# Patient Record
Sex: MALE | Race: WHITE | Employment: FULL TIME | URBAN - METROPOLITAN AREA
[De-identification: names, ages, dates, MRNs, and addresses within clinical notes are randomized per-mention and may not be internally consistent; named-entity substitution may affect disease eponyms.]

---

## 2017-06-02 ENCOUNTER — OFFICE VISIT (OUTPATIENT)
Dept: UROLOGY | Age: 62
End: 2017-06-02

## 2017-06-02 ENCOUNTER — HOSPITAL ENCOUNTER (OUTPATIENT)
Dept: ULTRASOUND IMAGING | Age: 62
Discharge: HOME | End: 2017-06-02
Attending: UROLOGY
Payer: COMMERCIAL

## 2017-06-02 VITALS
TEMPERATURE: 98.1 F | HEIGHT: 70 IN | BODY MASS INDEX: 27.63 KG/M2 | OXYGEN SATURATION: 98 % | HEART RATE: 83 BPM | WEIGHT: 193 LBS | SYSTOLIC BLOOD PRESSURE: 119 MMHG | DIASTOLIC BLOOD PRESSURE: 80 MMHG

## 2017-06-02 DIAGNOSIS — N50.89 TESTICULAR MASS: ICD-10-CM

## 2017-06-02 DIAGNOSIS — N50.89 TESTICULAR MASS: Primary | ICD-10-CM

## 2017-06-02 LAB
BILIRUB UR QL STRIP: NEGATIVE
GLUCOSE UR-MCNC: NEGATIVE MG/DL
KETONES P FAST UR STRIP-MCNC: NEGATIVE MG/DL
PH UR STRIP: 5.5 [PH] (ref 4.6–8)
PROT UR QL STRIP: NEGATIVE MG/DL
SP GR UR STRIP: 1 (ref 1–1.03)
UA UROBILINOGEN AMB POC: NORMAL (ref 0.2–1)
URINALYSIS CLARITY POC: CLEAR
URINALYSIS COLOR POC: YELLOW
URINE BLOOD POC: NEGATIVE
URINE LEUKOCYTES POC: NEGATIVE
URINE NITRITES POC: NEGATIVE

## 2017-06-02 PROCEDURE — 76870 US EXAM SCROTUM: CPT

## 2017-06-02 RX ORDER — ERGOCALCIFEROL 1.25 MG/1
CAPSULE ORAL
Refills: 0 | COMMUNITY
Start: 2017-05-25

## 2017-06-02 RX ORDER — ASPIRIN 81 MG/1
TABLET ORAL DAILY
COMMUNITY

## 2017-06-02 NOTE — MR AVS SNAPSHOT
Visit Information Date & Time Provider Department Dept. Phone Encounter #  
 6/2/2017 10:00 AM Sarahi Wright, [facility] [phone] [id number] Upcoming Health Maintenance Date Due Hepatitis C Screening 1955 Pneumococcal 19-64 Medium Risk (1 of 1 - PPSV23) 10/18/1975 DTaP/Tdap/Td series (1 - Tdap) 10/18/1977 FOBT Q 1 YEAR AGE 50-75 10/18/2006 ZOSTER VACCINE AGE 60> 10/18/2016 INFLUENZA AGE 9 TO ADULT 8/1/2017 Allergies as of 6/2/2017  Review Complete On: 6/2/2017 By: Yuan Paul LPN No Known Allergies Current Immunizations  Never Reviewed No immunizations on file. Not reviewed this visit You Were Diagnosed With   
  
 Codes Comments Testicular mass    -  Primary ICD-10-CM: N50.9 ICD-9-CM: 608.89 Vitals BP Pulse Temp Height(growth percentile) Weight(growth percentile) SpO2  
 119/80 (BP 1 Location: Left arm, BP Patient Position: Sitting) 83 98.1 °F (36.7 °C) 5' 9.5\" (1.765 m) 193 lb (87.5 kg) 98% BMI Smoking Status 28.09 kg/m2 Current Every Day Smoker Vitals History BMI and BSA Data Body Mass Index Body Surface Area 28.09 kg/m 2 2.07 m 2 Preferred Pharmacy Pharmacy Name Phone [address], [address] [place] [address] [facility] [phone] Your Updated Medication List  
  
   
This list is accurate as of: 6/2/17 11:09 AM.  Always use your most recent med list.  
  
  
  
  
 aspirin delayed-release 81 mg tablet Take  by mouth daily. clotrimazole-betamethasone topical cream  
Commonly known as:  LOTRISONE  
  
 diclofenac EC 75 mg EC tablet Commonly known as:  VOLTAREN  
1 tab PO BID with food  
  
 gabapentin 300 mg capsule Commonly known as:  NEURONTIN Take 1 Cap by mouth three (3) times daily. ketoconazole 200 mg tablet Commonly known as:  NIZORAL  
  
 topiramate 25 mg tablet Commonly known as:  TOPAMAX 3 tabs PO QHS  
  
 triamcinolone acetonide 0.1 % topical cream  
Commonly known as:  KENALOG  
  
 VITAMIN D2 50,000 unit capsule Generic drug:  ergocalciferol We Performed the Following AMB POC URINALYSIS DIP STICK AUTO W/O MICRO [91864 CPT(R)] To-Do List   
 06/02/2017 Imaging:  US SCROTUM/TESTICLES   
  
 06/02/2017 1:45 PM  
  Appointment with [facility]  RM 2 at [address] [place] [address] ([phone]) OUTSIDE FILMS  - Any outside films related to the study being scheduled should be brought with you on the day of the exam.  If this cannot be done there may be a delay in the reading of the study. MEDICATIONS  - Patient must bring a complete list of all medications currently taking to include prescriptions, over-the-counter meds, herbals, vitamins & any dietary supplements Patient Instructions Testicular Pain: Care Instructions Your Care Instructions Pain in the testicles can be caused by many things. These include an injury to your testicles, an infection, and testicular torsion. Injuries and genital problems most often happen during sports or recreational activities, at work, or in a fall. Pain caused by an injury usually goes away quickly. There is usually no long-term harm to your testicles. Infections that may cause pain include: · An infection of the testicles. This is called orchitis. · An abscess in the scrotum or testicles. · Some sexually transmitted infections (STIs). · A swelling of the tube attached to a testicle. This swelling is called epididymitis. It can cause pain and is sometimes caused by an infection. Testicular torsion happens when a testicle twists on the spermatic cord. This cuts off the blood supply to the testicle. This is a serious condition that requires surgery. Follow-up care is a key part of your treatment and safety.  Be sure to make and go to all appointments, and call your doctor if you are having problems. It's also a good idea to know your test results and keep a list of the medicines you take. How can you care for yourself at home? · Rest and protect your testicles and groin. Stop, change, or take a break from any activity that may be causing your pain or soreness. · Put ice or a cold pack on the area for 10 to 20 minutes at a time. Put a thin cloth between the ice and your skin. · Wear briefs, not boxers. Briefs help support the injured area. You can use a jock strap if it helps relieve your pain. · If your doctor prescribed antibiotics, take them as directed. Do not stop taking them just because you feel better. You need to take the full course of antibiotics. · Ask your doctor if you can take an over-the-counter pain medicine, such as acetaminophen (Tylenol), ibuprofen (Advil, Motrin), or naproxen (Aleve). Be safe with medicines. Read and follow all instructions on the label. · If the doctor gave you a prescription medicine for pain, take it as prescribed. When should you call for help? Call your doctor now or seek immediate medical care if: 
· You have severe or increasing pain. · You notice a change in how your testicles look or are positioned in your scrotum. · You notice new or worse swelling in your scrotum. · You have symptoms of a urinary problem, such as a urinary tract infection. These may include: 
¨ Pain or burning when you urinate. ¨ A frequent need to urinate without being able to pass much urine. ¨ Pain in the flank, which is just below the rib cage and above the waist on either side of the back. ¨ Blood in your urine. ¨ A fever. Watch closely for changes in your health, and be sure to contact your doctor if: 
· You do not get better as expected. Where can you learn more? Go to http://nguyen-matt.info/.  
Enter Z711 in the search box to learn more about \"Testicular Pain: Care Instructions. \" Current as of: August 12, 2016 Content Version: 11.2 © 9908-7999 [employer]andelion. Care instructions adapted under license by [employer] (which disclaims liability or warranty for this information). If you have questions about a medical condition or this instruction, always ask your healthcare professional. [address] any warranty or liability for your use of this information. Introducing [facility]! Kaitlyn Bennett introduces Yicha Online patient portal. Now you can access parts of your medical record, email your doctor's office, and request medication refills online. 1. In your internet browser, go to [email or web address] 2. Click on the First Time User? Click Here link in the Sign In box. You will see the New Member Sign Up page. 3. Enter your Y[employer]a Online Access Code exactly as it appears below. You will not need to use this code after youve completed the sign-up process. If you do not sign up before the expiration date, you must request a new code. · [employer] Online Access Code: [email or web address] Expires: 8/31/2017  9:55 AM 
 
4. Enter the last four digits of your Social Security Number (xxxx) and Date of Birth (mm/dd/yyyy) as indicated and click Submit. You will be taken to the next sign-up page. 5. Create a Yicha Online ID. This will be your Yicha Online login ID and cannot be changed, so think of one that is secure and easy to remember. 6. Create a [employer] Online password. You can change your password at any time. 7. Enter your Password Reset Question and Answer. This can be used at a later time if you forget your password. 8. Enter your e-mail address. You will receive e-mail notification when new information is available in [address] 9. Click Sign Up. You can now view and download portions of your medical record. 10. Click the Download Summary menu link to download a portable copy of your medical information. If you have questions, please visit the Frequently Asked Questions section of the [employer] website. Remember, [employer] is NOT to be used for urgent needs. For medical emergencies, dial 911. Now available from your iPhone and Android! Please provide this summary of care documentation to your next provider. Your primary care clinician is listed as ROBYN WOOD. If you have any questions after today's visit, please call [phone].

## 2017-06-02 NOTE — PROGRESS NOTES
Mr. Blanca Anthony has a reminder for a \"due or due soon\" health maintenance. I have asked that he contact his primary care provider for follow-up on this health maintenance. RBV per Dr. Barbara Caldwell an order was placed for patient to have a Scrotal US.
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
## 2017-06-02 NOTE — PATIENT INSTRUCTIONS
Testicular Pain: Care Instructions  Your Care Instructions    Pain in the testicles can be caused by many things. These include an injury to your testicles, an infection, and testicular torsion. Injuries and genital problems most often happen during sports or recreational activities, at work, or in a fall. Pain caused by an injury usually goes away quickly. There is usually no long-term harm to your testicles. Infections that may cause pain include:  · An infection of the testicles. This is called orchitis. · An abscess in the scrotum or testicles. · Some sexually transmitted infections (STIs). · A swelling of the tube attached to a testicle. This swelling is called epididymitis. It can cause pain and is sometimes caused by an infection. Testicular torsion happens when a testicle twists on the spermatic cord. This cuts off the blood supply to the testicle. This is a serious condition that requires surgery. Follow-up care is a key part of your treatment and safety. Be sure to make and go to all appointments, and call your doctor if you are having problems. It's also a good idea to know your test results and keep a list of the medicines you take. How can you care for yourself at home? · Rest and protect your testicles and groin. Stop, change, or take a break from any activity that may be causing your pain or soreness. · Put ice or a cold pack on the area for 10 to 20 minutes at a time. Put a thin cloth between the ice and your skin. · Wear briefs, not boxers. Briefs help support the injured area. You can use a jock strap if it helps relieve your pain. · If your doctor prescribed antibiotics, take them as directed. Do not stop taking them just because you feel better. You need to take the full course of antibiotics. · Ask your doctor if you can take an over-the-counter pain medicine, such as acetaminophen (Tylenol), ibuprofen (Advil, Motrin), or naproxen (Aleve). Be safe with medicines.  Read and follow all instructions on the label. · If the doctor gave you a prescription medicine for pain, take it as prescribed. When should you call for help? Call your doctor now or seek immediate medical care if:  · You have severe or increasing pain. · You notice a change in how your testicles look or are positioned in your scrotum. · You notice new or worse swelling in your scrotum. · You have symptoms of a urinary problem, such as a urinary tract infection. These may include:  ¨ Pain or burning when you urinate. ¨ A frequent need to urinate without being able to pass much urine. ¨ Pain in the flank, which is just below the rib cage and above the waist on either side of the back. ¨ Blood in your urine. ¨ A fever. Watch closely for changes in your health, and be sure to contact your doctor if:  · You do not get better as expected. Where can you learn more? Go to http://nguyen-matt.info/. Enter F840 in the search box to learn more about \"Testicular Pain: Care Instructions. \"  Current as of: August 12, 2016  Content Version: 11.2  © 3371-9108 Open Mile. Care instructions adapted under license by [employer] (which disclaims liability or warranty for this information). If you have questions about a medical condition or this instruction, always ask your healthcare professional. [address] any warranty or liability for your use of this information.

## 2019-07-23 ENCOUNTER — OFFICE VISIT (OUTPATIENT)
Dept: ORTHOPEDIC SURGERY | Age: 64
End: 2019-07-23

## 2019-07-23 VITALS
TEMPERATURE: 97.7 F | BODY MASS INDEX: 28.09 KG/M2 | HEIGHT: 70 IN | HEART RATE: 80 BPM | SYSTOLIC BLOOD PRESSURE: 143 MMHG | DIASTOLIC BLOOD PRESSURE: 90 MMHG | RESPIRATION RATE: 18 BRPM

## 2019-07-23 DIAGNOSIS — M54.12 CERVICAL RADICULOPATHY: Primary | ICD-10-CM

## 2019-07-23 RX ORDER — AMITRIPTYLINE HYDROCHLORIDE 25 MG/1
75 TABLET, FILM COATED ORAL
Qty: 90 TAB | Refills: 2 | Status: SHIPPED | OUTPATIENT
Start: 2019-07-23

## 2019-07-23 RX ORDER — KETOROLAC TROMETHAMINE 15 MG/ML
60 INJECTION, SOLUTION INTRAMUSCULAR; INTRAVENOUS ONCE
Qty: 1 VIAL | Refills: 0
Start: 2019-07-23 | End: 2019-07-23

## 2019-07-23 RX ORDER — PREDNISONE 10 MG/1
TABLET ORAL
Qty: 21 TAB | Refills: 0 | Status: SHIPPED | OUTPATIENT
Start: 2019-07-23

## 2019-07-23 RX ORDER — CYCLOBENZAPRINE HCL 10 MG
TABLET ORAL
COMMUNITY

## 2019-07-23 NOTE — PROGRESS NOTES
[address], [address]  [place] [address]  Phone: [phone]  Fax: [phone]        Abdulkadir Aquino  : 1955  PCP: Krupa Peng MD  2019    NEW PATIENT      HISTORY OF PRESENT ILLNESS  Fox Cabrera is a 59 y.o. male c/o an exacerbation of chronic neck pain. Pt notes that about 3-4 months ago, an elderly neighbor fell in his front yard, and when he tried to help him get up, he fell as well. He finished mowing his grass, but he began to experience neck pain that resolved about a week or so later. At the end of , he was working in his yard, and he had excruciating neck pain that began radiating into his LUE into his ring and little finger. He also c/o a burning pain radiating into his LUE and into his left chest, but it has improved some, especially his chest. He feels weakness in his left hand and notes that he cannot move all of the fingers of his left hand. Pt states he can move his neck in a certain way to improve his LUE burning, numbness, and tingling. He has no h/o spine surgery. He has tried Flexeril and Prednisone but is unsure if they were beneficial. He has difficulty sleeping due to pain. He previously saw Dr. Corinna Pelayo for low back pain and tried: Gabapentin, Topamax, and Diclofenac. He states that he tried PT previously without benefit for his low back pain. He rates his pain as an 8/10 today. ASSESSMENT  His symptoms are likely associated with a left C8/T1 radiculopathy. On examination, he had weakness (intrinsic hand, triceps) and decreased sensation in a C8 distribution. He has pain in the chest, potentially at the T1 dermatome. PLAN  1. Cervical MRI to evaluate for left C8/T1 radiculopathy. 2. Toradol injection - 60 mg  3.  10 mg Prednisone injection. 4. Amitriptyline 75 mg QHS. 5. Work note to remain out of work while undergoing diagnostics and treatment.     Pt will f/u after MRI or sooner if needed. Diagnoses and all orders for this visit:    1. Cervical radiculopathy  -     MRI CERV SPINE WO CONT; Future  -     predniSONE (STERAPRED DS) 10 mg dose pack; See administration instruction per 10mg dose pack  -     KETOROLAC TROMETHAMINE INJ  -     ketorolac (TORADOL) 15 mg/mL soln injection; 4 mL by IntraMUSCular route once for 1 dose. -     AK THER/PROPH/DIAG INJECTION, SUBCUT/IM  -     amitriptyline (ELAVIL) 25 mg tablet; Take 3 Tabs by mouth nightly. CHIEF COMPLAINT  Rodrigo Palacios is seen today in consultation at the request of Antonio Navas MD for complaints of neck pain radiating into LUE. PAST MEDICAL HISTORY   Past Medical History:   Diagnosis Date    Arthritis     degenerative arthritis of spine       No past surgical history on file. MEDICATIONS    Current Outpatient Medications   Medication Sig Dispense Refill    VITAMIN D2 50,000 unit capsule   0    aspirin delayed-release 81 mg tablet Take  by mouth daily.  triamcinolone acetonide (KENALOG) 0.1 % topical cream   0    gabapentin (NEURONTIN) 300 mg capsule Take 1 Cap by mouth three (3) times daily. 90 Cap 1    clotrimazole-betamethasone (LOTRISONE) topical cream   0    ketoconazole (NIZORAL) 200 mg tablet   0    topiramate (TOPAMAX) 25 mg tablet 3 tabs PO QHS 90 Tab 0    diclofenac EC (VOLTAREN) 75 mg EC tablet 1 tab PO BID with food 30 Tab 0       ALLERGIES  No Known Allergies       SOCIAL HISTORY    Social History     Socioeconomic History    Marital status:      Spouse name: Not on file    Number of children: Not on file    Years of education: Not on file    Highest education level: Not on file   Tobacco Use    Smoking status: Current Every Day Smoker     Packs/day: 1.50    Smokeless tobacco: Never Used   Substance and Sexual Activity    Alcohol use:  Yes     Alcohol/week: 0.0 standard drinks     Comment: social    Drug use: No    Sexual activity: Yes       FAMILY HISTORY  Family History Problem Relation Age of Onset    Diabetes Other     Cancer Mother          REVIEW OF SYSTEMS  Review of Systems   Musculoskeletal: Positive for neck pain ( LUE paraesthesia and weakness). PHYSICAL EXAMINATION  There were no vitals taken for this visit. Pain Assessment  1/8/2016   Location of Pain Back;Leg;Hip   Pain Location Comment bilat hips   Location Modifiers Left;Right   Severity of Pain 1   Quality of Pain Sharp;Burning   Quality of Pain Comment occasionally numbness, tingling, weakness   Duration of Pain A few minutes   Frequency of Pain Intermittent   Aggravating Factors Standing;Walking   Aggravating Factors Comment carrying loads, arching backwards   Limiting Behavior Yes   Relieving Factors Rest;Heat   Relieving Factors Comment heat is soothing while applied, traction         Constitutional:  Well developed, well nourished, in no acute distress. Psychiatric: Affect and mood are appropriate. HEENT: Normocephalic, atraumatic. Extraocular movements intact. Integumentary: No rashes or abrasions noted on exposed areas. Cardiovascular: Regular rate and rhythm. Pulmonary: Clear to auscultation bilaterally. SPINE/MUSCULOSKELETAL EXAM    Cervical spine:  Neck is midline. Normal muscle tone. No focal atrophy is noted. ROM pain free. Shoulder ROM intact. No tenderness to palpation. Positive Spurling's sign on the L. Negative Tinel's sign. Negative Muñiz's sign. Decreased sensation in a C8 distribution on the L. Lumbar spine:  No rash, ecchymosis, or gross obliquity. No fasciculations. No focal atrophy is noted. No pain with hip ROM. Full range of motion. No tenderness to palpation. No tenderness to palpation at the sciatic notch. SI joints non-tender. Trochanters non tender. Sensation in the bilateral legs grossly intact to light touch.       MOTOR:      Biceps  Triceps Deltoids Wrist Ext Wrist Flex Hand Intrin   Right 5/5 5/5 5/5 5/5 5/5 5/5   Left 5/5 4/5 5/5 5/5 5/5 4/5             Hip Flex  Quads Hamstrings Ankle DF EHL Ankle PF   Right 5/5 5/5 5/5 5/5 5/5 5/5   Left 5/5 5/5 5/5 5/5 5/5 5/5     Weakness with finger abduction on the L. Intrinsic hand weakness on the L. Weakness with triceps on the L. Overall weakness in C8 distribution. DTRs are 2+ biceps, triceps, brachioradialis, patella, and Achilles. Negative Straight Leg raise. Squat not tested. No difficulty with tandem gait. Ambulation without assistive device. FWB. RADIOGRAPHS  2V Cervical XR images taken on 7/23/19 personally reviewed with patient:  Straightening of cervical lordosis  Flexed forward posture  Disc degeneration C4 down  Facet sclerosis     reviewed    Mr. Cristian Alicea has a reminder for a \"due or due soon\" health maintenance. I have asked that he contact his primary care provider for follow-up on this health maintenance. 32 minutes of face-to-face contact were spent with the patient during today's visit extensively discussing symptoms and treatment plan. All questions were answered. More than half of this visit today was spent on counseling. Written by Renae Montes, as dictated by Dr. Verner Jacobsen. I, Dr. Verner Jacobsen, confirm that all documentation is accurate.

## 2019-07-23 NOTE — PATIENT INSTRUCTIONS
Amitriptyline Daily Instructions:  Days 1 to 3: Take one 25 mg pill at night  Days 4 to 6: Take two 25 mg pills at night  After day 6: Take three 25 mg pills at night     Pinched Nerve in the Neck: Care Instructions  Your Care Instructions  A pinched nerve in the neck happens when a vertebra or disc in the upper part of your spine is damaged. This damage can happen because of an injury. Or it can just happen with age. The changes caused by the damage may put pressure on a nearby nerve root, pinching it. This causes symptoms such as sharp pain in your neck, shoulder, arm, hand, or back. You may also have tingling or numbness. Sometimes it makes your arm weaker. The symptoms are usually worse when you turn your head or strain your neck. For many people, the symptoms get better over time and finally go away. Early treatment usually includes medicines for pain and swelling. Sometimes physical therapy and special exercises may help. Follow-up care is a key part of your treatment and safety. Be sure to make and go to all appointments, and call your doctor if you are having problems. It's also a good idea to know your test results and keep a list of the medicines you take. How can you care for yourself at home? · Be safe with medicines. Read and follow all instructions on the label. ? If the doctor gave you a prescription medicine for pain, take it as prescribed. ? If you are not taking a prescription pain medicine, ask your doctor if you can take an over-the-counter medicine. · Try using a heating pad on a low or medium setting for 15 to 20 minutes every 2 or 3 hours. Try a warm shower in place of one session with the heating pad. You can also buy single-use heat wraps that last up to 8 hours. · You can also try an ice pack for 10 to 15 minutes every 2 to 3 hours. There isn't strong evidence that either heat or ice will help. But you can try them to see if they help you. · Don't spend too long in one position. Take short breaks to move around and change positions. · Wear a seat belt and shoulder harness when you are in a car. · Sleep with a pillow under your head and neck that keeps your neck straight. · If you were given a neck brace (cervical collar) to limit neck motion, wear it as instructed for as many days as your doctor tells you to. Do not wear it longer than you were told to. Wearing a brace for too long can lead to neck stiffness and can weaken the neck muscles. · Follow your doctor's instructions for gentle neck-stretching exercises. · Do not smoke. Smoking can slow healing of your discs. If you need help quitting, talk to your doctor about stop-smoking programs and medicines. These can increase your chances of quitting for good. · Avoid strenuous work or exercise until your doctor says it is okay. When should you call for help? Call 911 anytime you think you may need emergency care. For example, call if:    · You are unable to move an arm or a leg at all.   [facility] your doctor now or seek immediate medical care if:    · You have new or worse symptoms in your arms, legs, chest, belly, or buttocks. Symptoms may include:  ? Numbness or tingling. ? Weakness. ? Pain.     · You lose bladder or bowel control.    Watch closely for changes in your health, and be sure to contact your doctor if:    · You are not getting better as expected. Where can you learn more? Go to http://nguyen-matt.info/. Enter A554 in the search box to learn more about \"Pinched Nerve in the Neck: Care Instructions. \"  Current as of: September 20, 2018  Content Version: 12.1  © 8073-4171 Healthwise, Incorporated. Care instructions adapted under license by [employer] (which disclaims liability or warranty for this information).  If you have questions about a medical condition or this instruction, always ask your healthcare professional. [address]  any warranty or liability for your use of this information.

## 2019-07-23 NOTE — LETTER
NOTIFICATION RETURN TO WORK  
 
7/23/2019 1:58 PM 
 
Mr. Delphine Geller 
[address] [facility] [place] [address] To Whom It May Concern: 
 
Delphine Geller is currently under the care of [facility]. He will remain out of work while he is undergoing diagnostic testing and further treatment. If there are questions or concerns please have the patient contact our office.  
 
 
 
Sincerely, 
 
 
Cherie Zapata MD

## 2019-07-26 ENCOUNTER — TELEPHONE (OUTPATIENT)
Dept: ORTHOPEDIC SURGERY | Age: 64
End: 2019-07-26

## 2019-07-26 DIAGNOSIS — R52 PAIN: Primary | ICD-10-CM

## 2019-07-26 NOTE — TELEPHONE ENCOUNTER
Patient called and stated that he has severe neck pain and his does not think he can not proceed with the MRI without something to assist him during the procedure. The patient is wanting something prescribed for the procedure to assist with the pain during the procedure.      Patient may be reached at [phone]

## 2019-07-26 NOTE — TELEPHONE ENCOUNTER
VO for Valium 5 mg PO 30 mins prior to MRI. Do not take with Alprazolam. Must have . Phone in.  Dispense #1 0RF

## 2019-07-29 RX ORDER — DIAZEPAM 2 MG/1
TABLET ORAL
Status: CANCELLED | OUTPATIENT
Start: 2019-07-29

## 2019-07-29 NOTE — TELEPHONE ENCOUNTER
He does not want Valium he is not claustrophobic. He wants something for pain it does not have be opioids. The prednisone did not help much and he did not receive the amitriptyline from the pharmacy. He will go by and pick it up.

## 2019-07-29 NOTE — TELEPHONE ENCOUNTER
Patient called again and is requesting a call back from [facility] office in regards to the MRI and medication. Patient tel. [phone].

## 2019-07-30 DIAGNOSIS — R52 PAIN: ICD-10-CM

## 2019-07-30 RX ORDER — HYDROCODONE BITARTRATE AND ACETAMINOPHEN 7.5; 325 MG/1; MG/1
1 TABLET ORAL ONCE
Qty: 1 TAB | Refills: 0 | Status: SHIPPED | OUTPATIENT
Start: 2019-07-30 | End: 2019-07-30 | Stop reason: SDUPTHER

## 2019-07-30 RX ORDER — HYDROCODONE BITARTRATE AND ACETAMINOPHEN 7.5; 325 MG/1; MG/1
1 TABLET ORAL ONCE
Qty: 1 TAB | Refills: 0 | Status: SHIPPED | OUTPATIENT
Start: 2019-07-30 | End: 2019-07-30

## 2019-07-30 NOTE — TELEPHONE ENCOUNTER
VO received for patient to have a one time rx for norco 7.5mg qty#1 RF0 patient informed that this may or may not help his nerve pain. Original rx printed at [facility] location. New encounter opened and order redone.

## 2019-07-30 NOTE — PROGRESS NOTES
Original rx printed at [facility] [place] [facility] location. New encounter opened and order redone.

## 2019-07-31 ENCOUNTER — HOSPITAL ENCOUNTER (OUTPATIENT)
Age: 64
Discharge: HOME | End: 2019-07-31
Attending: PHYSICAL MEDICINE & REHABILITATION
Payer: COMMERCIAL

## 2019-07-31 DIAGNOSIS — M54.12 CERVICAL RADICULOPATHY: ICD-10-CM

## 2019-07-31 PROCEDURE — 72141 MRI NECK SPINE W/O DYE: CPT

## 2019-08-01 ENCOUNTER — TELEPHONE (OUTPATIENT)
Dept: ORTHOPEDIC SURGERY | Age: 64
End: 2019-08-01

## 2019-08-01 DIAGNOSIS — J39.2 PHARYNGEAL MASS: Primary | ICD-10-CM

## 2019-08-01 NOTE — TELEPHONE ENCOUNTER
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    ----- Message -----  From: Ashok Rasmussen Results In  Sent: 7/31/2019  12:55 PM EDT  To: Aubrie Victoria MD

## 2019-08-01 NOTE — TELEPHONE ENCOUNTER
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 Return call information left.

## 2019-08-28 ENCOUNTER — OFFICE VISIT (OUTPATIENT)
Dept: ORTHOPEDIC SURGERY | Age: 64
End: 2019-08-28

## 2019-08-28 VITALS
DIASTOLIC BLOOD PRESSURE: 90 MMHG | RESPIRATION RATE: 14 BRPM | HEIGHT: 71 IN | OXYGEN SATURATION: 97 % | WEIGHT: 201.4 LBS | BODY MASS INDEX: 28.19 KG/M2 | HEART RATE: 89 BPM | SYSTOLIC BLOOD PRESSURE: 138 MMHG | TEMPERATURE: 98.3 F

## 2019-08-28 DIAGNOSIS — M54.12 CERVICAL RADICULOPATHY: Primary | ICD-10-CM

## 2019-08-28 DIAGNOSIS — M48.02 CERVICAL SPINAL STENOSIS: ICD-10-CM

## 2019-08-28 NOTE — PROGRESS NOTES
[address], [address]  [place] [address]  Phone: [phone]  Fax: [phone]        Formerly [facility]  : 1955  PCP: Cooper Quintana MD  2019    PROGRESS NOTE      HISTORY OF PRESENT ILLNESS  Eagle Aleman is a 59 y.o. male who was seen as a new patient 19 with c/o an exacerbation of chronic neck pain. Pt notes that about 3-4 months ago, an elderly neighbor fell in his front yard, and when he tried to help him get up, he fell as well. He finished mowing his grass, but he began to experience neck pain that resolved about a week or so later. At the end of , he was working in his yard, and he had excruciating neck pain that began radiating into his LUE into his ring and little finger. He also c/o a burning pain radiating into his LUE and into his left chest, but it has improved some, especially his chest. He feels weakness in his left hand and notes that he cannot move all of the fingers of his left hand. Pt states he can move his neck in a certain way to improve his LUE burning, numbness, and tingling. He has no h/o spine surgery. He has tried Flexeril and Prednisone but is unsure if they were beneficial. He has difficulty sleeping due to pain. He previously saw Dr. Mario Alberto Mahan for low back pain and tried: Gabapentin, Topamax, and Diclofenac. He states that he tried PT previously without benefit for his low back pain. He was given a Toradol injection (19). He was prescribed Amitriptyline. Eagle Aleman comes in to the office today for f/u. He did not find any relief from a  Prednisone dose pack. Pt notes that his neck pain has significantly improved, and he has not had any of the burning sensations in his LUE. He feels that most of his flare ups occur after he has been working overhead. He found relief with moving/stretching his neck to the right and down.  Pt reports h/o low back and associated leg pains that tend to be worse when he wears his belt tightly. He saw Dr. Jennifer Aquino for these complaints previously. Cervical MRI 7/31/19:  Advanced multilevel DDD and DJD. The canal is maximally narrowed, moderate central spinal stenosis C4/C5 and C5/C6. Multilevel foraminal narrowing, up to high grade. Possible minimal cord signal abnormality at C4/C5, where cord is most compressed. If indeed real, presumed gliosis or edema secondary to central spinal stenosis. Possible polyploid mucosal lesion at posterior oropharynx. Rounded 1.2 cm focus larger/more caudal than usual for uvula and suggested posterior to palate and uvula. Nonspecific. Recommend ENT referral. Reference axial series 6 image 116 and sagittal series 3 image 11. Of note, Pt notes that he was seen by an ENT and was cleared. He rates his pain as a 0/10 today. ASSESSMENT  His symptoms are likely related to a left cervical radiculopathy vs spinal stenosis as evidenced on his MRI. He continues to have decreased sensation and weakness on the left in a C8 distribution. We discussed the option of an EMG to further evaluate LUE weakness/numbness. He is not interested in a surgical option. PLAN  1. We discussed the option of an EMG to further evaluate LUE weakness/numbness, but he declines at this time. 2. He is not interested in a surgical consult/option. 3. He can call if he needs a Prednisone dose pack for a flare up. Pt will f/u in 6 months or sooner as needed. Diagnoses and all orders for this visit:    1. Cervical radiculopathy    2. Cervical spinal stenosis       PAST MEDICAL HISTORY   Past Medical History:   Diagnosis Date    Arthritis     degenerative arthritis of spine       No past surgical history on file. Rosie Ram       MEDICATIONS    Current Outpatient Medications   Medication Sig Dispense Refill    cyclobenzaprine (FLEXERIL) 10 mg tablet cyclobenzaprine 10 mg tablet      predniSONE (STERAPRED DS) 10 mg dose pack See administration instruction per 10mg dose pack 21 Tab 0    amitriptyline (ELAVIL) 25 mg tablet Take 3 Tabs by mouth nightly. 90 Tab 2    VITAMIN D2 50,000 unit capsule   0    aspirin delayed-release 81 mg tablet Take  by mouth daily.  triamcinolone acetonide (KENALOG) 0.1 % topical cream   0    gabapentin (NEURONTIN) 300 mg capsule Take 1 Cap by mouth three (3) times daily. 90 Cap 1    clotrimazole-betamethasone (LOTRISONE) topical cream   0    ketoconazole (NIZORAL) 200 mg tablet   0    topiramate (TOPAMAX) 25 mg tablet 3 tabs PO QHS 90 Tab 0    diclofenac EC (VOLTAREN) 75 mg EC tablet 1 tab PO BID with food 30 Tab 0        ALLERGIES  No Known Allergies       SOCIAL HISTORY    Social History     Socioeconomic History    Marital status:      Spouse name: Not on file    Number of children: Not on file    Years of education: Not on file    Highest education level: Not on file   Tobacco Use    Smoking status: Current Every Day Smoker     Packs/day: 1.50    Smokeless tobacco: Never Used   Substance and Sexual Activity    Alcohol use: Yes     Alcohol/week: 0.0 standard drinks     Comment: social    Drug use: No    Sexual activity: Yes   Other Topics Concern       FAMILY HISTORY  Family History   Problem Relation Age of Onset    Diabetes Other     Cancer Mother          REVIEW OF SYSTEMS  Review of Systems   Musculoskeletal: Positive for neck pain. LUE paraesthesia and weakness          PHYSICAL EXAMINATION  Visit Vitals  /90 (BP 1 Location: Left arm, BP Patient Position: Sitting)   Pulse 89   Temp 98.3 °F (36.8 °C) (Oral)   Resp 14   Ht 5' 10.75\" (1.797 m)   Wt 201 lb 6.4 oz (91.4 kg)   SpO2 97%   BMI 28.29 kg/m²       Pain Assessment  7/23/2019   Location of Pain Neck   Pain Location Comment -   Location Modifiers -   Severity of Pain 8   Quality of Pain Burning; Sharp   Quality of Pain Comment -   Duration of Pain Persistent   Frequency of Pain Constant   Aggravating Factors -   Aggravating Factors Comment - Limiting Behavior -   Relieving Factors -   Relieving Factors Comment -           Constitutional:  Well developed, well nourished, in no acute distress. Psychiatric: Affect and mood are appropriate. Integumentary: No rashes or abrasions noted on exposed areas. SPINE/MUSCULOSKELETAL EXAM    Cervical spine:  Neck is midline. Normal muscle tone. No focal atrophy is noted. ROM pain free. Shoulder ROM intact. No tenderness to palpation. Positive Spurling's sign on the L. Negative Tinel's sign. Negative Muñiz's sign. Decreased sensation in a C8 distribution on the L.        Lumbar spine:  No rash, ecchymosis, or gross obliquity. No fasciculations. No focal atrophy is noted. No pain with hip ROM. Full range of motion. No tenderness to palpation. No tenderness to palpation at the sciatic notch. SI joints non-tender. Trochanters non tender. Sensation in the bilateral legs grossly intact to light touch. Updates from 8/28/19:  Decreased sensation from C6-C8 on the left  Intrinsic hand weakness on the left. Wrist extension weakness on the L   No UMN signs    MOTOR:      Biceps  Triceps Deltoids Wrist Ext Wrist Flex Hand Intrin   Right 5/5 5/5 5/5 5/5 5/5 5/5   Left 5/5 4/5 5/5 4/5 5/5 4/5             Hip Flex  Quads Hamstrings Ankle DF EHL Ankle PF   Right 5/5 5/5 5/5 5/5 5/5 5/5   Left 5/5 5/5 5/5 5/5 5/5 5/5     Weakness with finger abduction on the L. Intrinsic hand weakness on the L. Weakness with triceps on the L. Overall weakness in C8 distribution.     DTRs are 2+ biceps, triceps, brachioradialis, patella, and Achilles.     Negative Straight Leg raise. Squat not tested. No difficulty with tandem gait.      Ambulation without assistive device.  FWB.       RADIOGRAPHS  Cervical MRI images taken on 7/31/19 personally reviewed with patient:  FINDINGS:  On the highest axial slice of this scan, there is a prominent rounded 1.2 x 1.0  cm focus projected centrally with in the oropharynx, peripheral increased T2  signal and slightly lesser T2 signal centrally. Partially visualized on sagittal  scans where it is suggested dorsal to the dorsal margin of the palate/uvula. This is larger than usual and more caudal than usual for uvula.      Multilevel grade 1 listhesis, most notably C7 anterior on T1 and C5 posterior on  C6. Disc space height markedly decreased C4/C5, C5/C6, C6/C7, decreased to  lesser degree at other levels. Vertebral body heights are preserved. There is  extensive bone marrow signal, reaction to DDD at multiple levels, most extensive  on either side of the C4/C5 and C5/C6 disc. There are some small endplate  impressions or cystic changes, benign/degenerative.     The cord is suboptimally evaluated. There is possibly but not definitely trace  increased T2 signal within the cord at C4/C5 (reference series 5 image 9), level  of moderate central spinal stenosis. If indeed real, cord gliosis or edema or  combination secondary to DDD is present.     Foramen magnum through C2:  -Canal nonstenotic  C2/C3:  -Small central disc protrusion with annular fissure. Small amount of posterior  canal ligament hypertrophy. Mild central spinal stenosis. -Left foramen patent  -Right foramen may be mildly narrowed. C3:  -Thickening of PLL with mild central spinal stenosis. C3/C4:  -Small central disc protrusion with annular fissure. The ventral cord is mildly  impressed. There is mild central spinal stenosis.   -Left foramen patent  -Mild-to-moderate degenerative narrowing right foramen  C4:  -Thickening of PLL with mild central spinal stenosis  C4/C5:  -Moderate-sized central disc protrusion with annular fissure; moderate low  signal ventral impression on right lateral recess thecal sac with lesser  degenerative impression on left lateral recess thecal sac. The cervical cord is  significantly impressed in the midline. Moderate central spinal stenosis. -Mild-to-moderate degenerative narrowing left foramen  -Moderate to marked degenerative narrowing right foramen  C5:  -Ligament thickening and/or uplifting at ventral canal with mild central spinal  stenosis. C5/C6:  -Disc bulge and suggested small right central to right lateral recess disc  protrusion. Posterior canal ligament hypertrophy. Moderate central spinal  stenosis. -Moderate degenerative narrowing left foramen  -Moderate degenerative narrowing right foramen  C6:  -Ligament thickening and/or uplifting with mild central spinal stenosis. C6/C7:  -Disc bulge plus minimal left lateral recess disc protrusion. Mild central  spinal stenosis. -Moderate to marked degenerative narrowing left foramen  -Mild-to-moderate degenerative narrowing right foramen  C7:   -Canal nonstenotic  C7/T1:  -Small disc bulge. Posterior canal ligament hypertrophy. Mild central spinal  stenosis. -May have moderate degenerative narrowing left foramen  -May have moderate degenerative narrowing right foramen  Visualized upper thoracic canal  -Nonstenotic     IMPRESSION  IMPRESSION:  1. Advanced multilevel DDD and DJD  -The canal is maximally narrowed, moderate central spinal stenosis C4/C5 and  C5/C6  -Multilevel foraminal narrowing, up to high grade  2. Possible minimal cord signal abnormality at C4/C5, where cord is most  compressed  -If indeed real, presumed gliosis or edema secondary to central spinal stenosis  3. Possible polyploid mucosal lesion at posterior oropharynx. Rounded 1.2 cm  focus larger/more caudal than usual for uvula and suggested posterior to palate  and uvula.  Nonspecific  -Recommend ENT referral   -Reference axial series 6 image 116 and sagittal series 3 image 11    2V Cervical XR images taken on 7/23/19 personally reviewed with patient:  Straightening of cervical lordosis  Flexed forward posture  Disc degeneration C4 down  Facet sclerosis    35 minutes of face-to-face contact were spent with the patient during today's visit extensively discussing symptoms and treatment plan. All questions were answered. More than half of this visit today was spent on counseling.      Written by Judah Mcdaniel as dictated by Marietta Bernstein MD

## 2019-08-28 NOTE — PATIENT INSTRUCTIONS
Electromyogram (EMG) and Nerve Conduction Studies: About These Tests  What are they? An electromyogram (EMG) measures the electrical activity of your muscles when you are not using them (at rest) and when you tighten them (muscle contraction). Nerve conduction studies (NCS) measure how well and how fast the nerves can send electrical signals. EMG and nerve conduction studies are often done together. If they are done together, the nerve conduction studies are done before the EMG. Why are they done? You may need an EMG to find diseases that damage your muscles or nerves or to find why you cannot move your muscles (paralysis), why they feel weak, or why they twitch. You may need nerve conduction studies to find damage to the nerves that lead from the brain and spinal cord to the rest of the body (peripheral nervous system). Nerve conduction studies are often used to help find nerve disorders, such as carpal tunnel syndrome. How can you prepare for these tests? · Tell your doctors ALL the medicines, vitamins, supplements, and herbal remedies you take. Some medicines can affect the test results. You may need to stop taking some medicines before you have this test.     · If you take aspirin or some other blood thinner, be sure to talk to your doctor. He or she will tell you if you should stop taking it before your test. Make sure that you understand exactly what your doctor wants you to do.     · Wear loose-fitting clothing. You may be given a hospital gown to wear.     · The electrodes for the test are attached to your skin. Your skin needs to be clean and free of sprays, oils, creams, and lotions. What happens during the tests? You lie on a table or bed or sit in a reclining chair so your muscles are relaxed. For an EMG:  · Your doctor will insert a needle electrode into a muscle.  This will record the electrical activity while the muscle is at rest. You may feel a quick, sharp pain when the needle electrode is put into a muscle. · Your doctor will ask you to tighten the same muscle slowly and steadily while the electrical activity is recorded. · Your doctor may move the electrode to a different area of the muscle or a different muscle. For nerve conduction studies:  · Your doctor will attach two types of electrodes to your skin. ? One type of electrode is placed over a nerve and will give the nerve an electrical pulse. ? The other type of electrode is placed over the muscle that the nerve controls. It will record how long it takes the muscle to react to the electrical pulse. · You will be able to feel the electrical pulses. They are small shocks and are safe. What else should you know about these tests? · After an EMG, you may be sore and have a tingling feeling in your muscles for up to 2 days. You may have small bruises or swelling at the needle site. · For an EMG, you may be asked to sign a consent form. Talk to your doctor about any concerns you have about the need for the test, its risks, how it will be done, or what the results will mean. How long do they take? · An EMG may take 30 to 60 minutes. · Nerve conduction tests may take from 15 minutes to 1 hour or more. It depends on how many nerves and muscles your doctor tests. What happens after these tests? · If any of the test areas are sore:  ? Put ice or a cold pack on the area for 10 to 20 minutes at a time. Put a thin cloth between the ice and your skin. ? Take an over-the-counter pain medicine, such as acetaminophen (Tylenol), ibuprofen (Advil, Motrin), or naproxen (Aleve). Be safe with medicines. Read and follow all instructions on the label. · You will probably be able to go home right away. · You can go back to your usual activities right away. When should you call for help?   Watch closely for changes in your health, and be sure to contact your doctor if:  · Muscle pain from an EMG test gets worse or you have swelling, tenderness, or pus at any of the needle sites. · You have any problems that you think may be from the test.  · You have any questions about the test or have not received your results. Follow-up care is a key part of your treatment and safety. Be sure to make and go to all appointments, and call your doctor if you are having problems. It's also a good idea to keep a list of the medicines you take. Ask your doctor when you can expect to have your test results. Where can you learn more? Go to http://nguyen-matt.info/. Enter R505 in the search box to learn more about \"Electromyogram (EMG) and Nerve Conduction Studies: About These Tests. \"  Current as of: March 28, 2019  Content Version: 12.1  © 6945-3545 Healthwise, Incorporated. Care instructions adapted under license by [employer] (which disclaims liability or warranty for this information). If you have questions about a medical condition or this instruction, always ask your healthcare professional. [address] any warranty or liability for your use of this information.

## 2021-12-27 ENCOUNTER — HOSPITAL ENCOUNTER (OUTPATIENT)
Dept: LAB | Age: 66
Discharge: HOME | End: 2021-12-27
Payer: COMMERCIAL

## 2021-12-27 LAB
25(OH)D3 SERPL-MCNC: 23.2 NG/ML (ref 30–100)
ALBUMIN SERPL-MCNC: 3.8 G/DL (ref 3.4–5)
ALBUMIN/GLOB SERPL: 1.3 {RATIO} (ref 0.8–1.7)
ALP SERPL-CCNC: 79 U/L (ref 45–117)
ALT SERPL-CCNC: 19 U/L (ref 16–61)
ANION GAP SERPL CALC-SCNC: 5 MMOL/L (ref 3–18)
AST SERPL-CCNC: 18 U/L (ref 10–38)
BASOPHILS # BLD: 0 K/UL (ref 0–0.1)
BASOPHILS NFR BLD: 0 % (ref 0–2)
BILIRUB SERPL-MCNC: 0.6 MG/DL (ref 0.2–1)
BUN SERPL-MCNC: 11 MG/DL (ref 7–18)
BUN/CREAT SERPL: 13 (ref 12–20)
CALCIUM SERPL-MCNC: 9 MG/DL (ref 8.5–10.1)
CHLORIDE SERPL-SCNC: 106 MMOL/L (ref 100–111)
CO2 SERPL-SCNC: 28 MMOL/L (ref 21–32)
CREAT SERPL-MCNC: 0.82 MG/DL (ref 0.6–1.3)
DIFFERENTIAL METHOD BLD: ABNORMAL
EOSINOPHIL # BLD: 0.1 K/UL (ref 0–0.4)
EOSINOPHIL NFR BLD: 1 % (ref 0–5)
ERYTHROCYTE [DISTWIDTH] IN BLOOD BY AUTOMATED COUNT: 14.6 % (ref 11.6–14.5)
GLOBULIN SER CALC-MCNC: 2.9 G/DL (ref 2–4)
GLUCOSE SERPL-MCNC: 109 MG/DL (ref 74–99)
HCT VFR BLD AUTO: 42.5 % (ref 36–48)
HGB BLD-MCNC: 14.2 G/DL (ref 13–16)
IMM GRANULOCYTES # BLD AUTO: 0 K/UL (ref 0–0.04)
IMM GRANULOCYTES NFR BLD AUTO: 0 % (ref 0–0.5)
LYMPHOCYTES # BLD: 2.1 K/UL (ref 0.9–3.6)
LYMPHOCYTES NFR BLD: 28 % (ref 21–52)
MCH RBC QN AUTO: 32.1 PG (ref 24–34)
MCHC RBC AUTO-ENTMCNC: 33.4 G/DL (ref 31–37)
MCV RBC AUTO: 96.2 FL (ref 78–100)
MONOCYTES # BLD: 0.8 K/UL (ref 0.05–1.2)
MONOCYTES NFR BLD: 10 % (ref 3–10)
NEUTS SEG # BLD: 4.6 K/UL (ref 1.8–8)
NEUTS SEG NFR BLD: 61 % (ref 40–73)
NRBC # BLD: 0 K/UL (ref 0–0.01)
NRBC BLD-RTO: 0 PER 100 WBC
PLATELET # BLD AUTO: 295 K/UL (ref 135–420)
PMV BLD AUTO: 8.7 FL (ref 9.2–11.8)
POTASSIUM SERPL-SCNC: 4.5 MMOL/L (ref 3.5–5.5)
PROT SERPL-MCNC: 6.7 G/DL (ref 6.4–8.2)
PSA SERPL-MCNC: 0.9 NG/ML (ref 0–4)
RBC # BLD AUTO: 4.42 M/UL (ref 4.35–5.65)
SODIUM SERPL-SCNC: 139 MMOL/L (ref 136–145)
T4 FREE SERPL-MCNC: 1 NG/DL (ref 0.7–1.5)
TSH SERPL DL<=0.05 MIU/L-ACNC: 1.71 UIU/ML (ref 0.36–3.74)
WBC # BLD AUTO: 7.5 K/UL (ref 4.6–13.2)

## 2021-12-27 PROCEDURE — 80061 LIPID PANEL: CPT

## 2021-12-27 PROCEDURE — 82306 VITAMIN D 25 HYDROXY: CPT

## 2021-12-27 PROCEDURE — 80053 COMPREHEN METABOLIC PANEL: CPT

## 2021-12-27 PROCEDURE — 84439 ASSAY OF FREE THYROXINE: CPT

## 2021-12-27 PROCEDURE — 36415 COLL VENOUS BLD VENIPUNCTURE: CPT

## 2021-12-27 PROCEDURE — 85025 COMPLETE CBC W/AUTO DIFF WBC: CPT

## 2021-12-27 PROCEDURE — 84153 ASSAY OF PSA TOTAL: CPT

## 2021-12-27 PROCEDURE — 84403 ASSAY OF TOTAL TESTOSTERONE: CPT

## 2021-12-28 LAB — TESTOST SERPL-MCNC: 490 NG/DL (ref 264–916)

## 2021-12-29 LAB
CHOLEST SERPL-MCNC: 181 MG/DL
HDLC SERPL-MCNC: 89 MG/DL (ref 40–60)
HDLC SERPL: 2 {RATIO} (ref 0–5)
LDLC SERPL CALC-MCNC: 77.2 MG/DL (ref 0–100)
LIPID PROFILE,FLP: ABNORMAL
TRIGL SERPL-MCNC: 74 MG/DL (ref ?–150)
VLDLC SERPL CALC-MCNC: 14.8 MG/DL

## 2023-01-03 ENCOUNTER — TRANSCRIBE ORDER (OUTPATIENT)
Dept: SCHEDULING | Age: 68
End: 2023-01-03

## 2023-01-03 DIAGNOSIS — F17.200 NICOTINE DEPENDENCE, UNCOMPLICATED: Primary | ICD-10-CM

## 2023-01-20 ENCOUNTER — TRANSCRIBE ORDER (OUTPATIENT)
Dept: SCHEDULING | Age: 68
End: 2023-01-20

## 2023-01-20 DIAGNOSIS — F17.200 NICOTINE DEPENDENCE, UNCOMPLICATED: Primary | ICD-10-CM

## 2023-02-03 ENCOUNTER — HOSPITAL ENCOUNTER (OUTPATIENT)
Dept: CT IMAGING | Age: 68
Discharge: HOME | End: 2023-02-03
Attending: PHYSICIAN ASSISTANT
Payer: COMMERCIAL

## 2023-02-03 DIAGNOSIS — F17.200 NICOTINE DEPENDENCE, UNCOMPLICATED: ICD-10-CM

## 2023-02-03 PROCEDURE — 71271 CT THORAX LUNG CANCER SCR C-: CPT
